# Patient Record
Sex: FEMALE | Race: BLACK OR AFRICAN AMERICAN | NOT HISPANIC OR LATINO | URBAN - METROPOLITAN AREA
[De-identification: names, ages, dates, MRNs, and addresses within clinical notes are randomized per-mention and may not be internally consistent; named-entity substitution may affect disease eponyms.]

---

## 2024-03-15 ENCOUNTER — OFFICE VISIT (OUTPATIENT)
Dept: PRIMARY CARE | Facility: EXTERNAL LOCATION | Age: 24
End: 2024-03-15

## 2024-03-15 VITALS
HEART RATE: 96 BPM | WEIGHT: 210.4 LBS | TEMPERATURE: 98.3 F | OXYGEN SATURATION: 100 % | RESPIRATION RATE: 16 BRPM | DIASTOLIC BLOOD PRESSURE: 72 MMHG | BODY MASS INDEX: 39.73 KG/M2 | HEIGHT: 61 IN | SYSTOLIC BLOOD PRESSURE: 108 MMHG

## 2024-03-15 DIAGNOSIS — Z01.84 IMMUNITY STATUS TESTING: ICD-10-CM

## 2024-03-15 DIAGNOSIS — Z02.0 SCHOOL HEALTH EXAMINATION: Primary | ICD-10-CM

## 2024-03-15 PROBLEM — E66.01 OBESITY, CLASS III, BMI 40-49.9 (MORBID OBESITY) (MULTI): Status: ACTIVE | Noted: 2023-01-06

## 2024-03-15 PROBLEM — J34.3 HYPERTROPHY OF NASAL TURBINATES: Status: ACTIVE | Noted: 2022-09-16

## 2024-03-15 PROBLEM — J35.2 ADENOID HYPERTROPHY: Status: RESOLVED | Noted: 2022-09-16 | Resolved: 2024-03-15

## 2024-03-15 PROBLEM — J35.2 ADENOID HYPERTROPHY: Status: ACTIVE | Noted: 2022-09-16

## 2024-03-15 PROBLEM — K92.1 HEMATOCHEZIA: Status: ACTIVE | Noted: 2023-06-14

## 2024-03-15 PROBLEM — A74.9 CHLAMYDIA INFECTION: Status: ACTIVE | Noted: 2024-03-15

## 2024-03-15 PROBLEM — R61 HYPERHIDROSIS: Status: ACTIVE | Noted: 2024-03-15

## 2024-03-15 PROBLEM — A59.9 TRICHOMONIASIS: Status: ACTIVE | Noted: 2023-06-14

## 2024-03-15 PROBLEM — H52.209 ASTIGMATISM: Status: ACTIVE | Noted: 2024-03-15

## 2024-03-15 PROBLEM — D57.3 SICKLE CELL TRAIT (CMS-HCC): Status: ACTIVE | Noted: 2023-06-14

## 2024-03-15 PROBLEM — H52.10 MYOPIA: Status: ACTIVE | Noted: 2024-03-15

## 2024-03-15 PROBLEM — K27.9 PEPTIC ULCER: Status: ACTIVE | Noted: 2023-06-14

## 2024-03-15 PROCEDURE — 86787 VARICELLA-ZOSTER ANTIBODY: CPT | Mod: WESLAB | Performed by: NURSE PRACTITIONER

## 2024-03-15 PROCEDURE — 86706 HEP B SURFACE ANTIBODY: CPT | Mod: WESLAB | Performed by: NURSE PRACTITIONER

## 2024-03-15 PROCEDURE — 86317 IMMUNOASSAY INFECTIOUS AGENT: CPT | Mod: WESLAB | Performed by: NURSE PRACTITIONER

## 2024-03-15 PROCEDURE — 86765 RUBEOLA ANTIBODY: CPT | Mod: WESLAB | Performed by: NURSE PRACTITIONER

## 2024-03-15 PROCEDURE — 86735 MUMPS ANTIBODY: CPT | Mod: WESLAB | Performed by: NURSE PRACTITIONER

## 2024-03-15 RX ORDER — MEDROXYPROGESTERONE ACETATE 150 MG/ML
150 INJECTION, SUSPENSION INTRAMUSCULAR
COMMUNITY
Start: 2022-11-11 | End: 2024-09-04

## 2024-03-15 ASSESSMENT — ENCOUNTER SYMPTOMS
TROUBLE SWALLOWING: 0
DIFFICULTY URINATING: 0
SEIZURES: 0
NECK PAIN: 0
STRIDOR: 0
SPEECH DIFFICULTY: 0
ACTIVITY CHANGE: 0
WEAKNESS: 0
NECK STIFFNESS: 0
COLOR CHANGE: 0
TREMORS: 0
FATIGUE: 0
JOINT SWELLING: 0
ABDOMINAL PAIN: 0
BACK PAIN: 0
FEVER: 0
SHORTNESS OF BREATH: 0
LIGHT-HEADEDNESS: 0
RHINORRHEA: 0
DIARRHEA: 0
WHEEZING: 0
COUGH: 0
EYE DISCHARGE: 0
CONSTIPATION: 0
DIZZINESS: 0
POLYDIPSIA: 0
ARTHRALGIAS: 0
DECREASED CONCENTRATION: 0
CONFUSION: 0
DIAPHORESIS: 0
MYALGIAS: 0
APPETITE CHANGE: 0
EYE PAIN: 0
NAUSEA: 0
POLYPHAGIA: 0
UNEXPECTED WEIGHT CHANGE: 0
HEADACHES: 0
ABDOMINAL DISTENTION: 0
PHOTOPHOBIA: 0
CHILLS: 0
NUMBNESS: 0
VOMITING: 0
EYE REDNESS: 0
PALPITATIONS: 0
EYE ITCHING: 0
VOICE CHANGE: 0
CHEST TIGHTNESS: 0

## 2024-03-15 NOTE — PROGRESS NOTES
"Subjective   Patient ID: Juan Zheng is a 23 y.o. 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    HPI:  Here for nursing school physical.     Has past medical history of seizures: last one at age 10. Unknown cause. Was told can \"happen again\".     Denies chest pain, shortness of breath, musculoskeletal complaints, neuro complaints, vision changes or any symptoms that would interfere with ability to participate in nursing school, nursing clinicals or provide patient care. Has no symptoms/complaints at this time.      Patient has never been restricted/declined participation in activities from a medical standpoint.      Denies family history of sudden cardiac death at an early age.     No Known Allergies      Current Outpatient Medications on File Prior to Visit   Medication Sig    medroxyPROGESTERone 150 mg/mL injection syringe Inject 1 mL (150 mg) into the muscle.     No current facility-administered medications on file prior to visit.          Past Medical History:   Diagnosis Date    Astigmatism 03/15/2024    Hypertrophy of nasal turbinates 09/16/2022    Myopia 03/15/2024    Peptic ulcer 06/14/2023    Seizure (CMS/[facility])     last one at age 10    Sickle cell trait (CMS/[facility]) 06/14/2023       Past Surgical History:   Procedure Laterality Date    ADENOIDECTOMY      TONSILLECTOMY  09/24/2014    Tonsillectomy With Adenoidectomy       No family history on file.     Review of Systems   Constitutional:  Negative for activity change, appetite change, chills, diaphoresis, fatigue, fever and unexpected weight change.   HENT:  Negative for congestion, dental problem, hearing loss, rhinorrhea, tinnitus, trouble swallowing and voice change.    Eyes:  Negative for photophobia, pain, discharge, redness, itching and visual disturbance.   Respiratory:  Negative for cough, chest tightness, shortness of breath, wheezing and stridor.    Cardiovascular:  Negative for chest pain, palpitations and leg swelling. " "  Gastrointestinal:  Negative for abdominal distention, abdominal pain, constipation, diarrhea, nausea and vomiting.   Endocrine: Negative for cold intolerance, heat intolerance, polydipsia, polyphagia and polyuria.   Genitourinary:  Negative for difficulty urinating.   Musculoskeletal:  Negative for arthralgias, back pain, gait problem, joint swelling, myalgias, neck pain and neck stiffness.   Skin:  Negative for color change and rash.   Neurological:  Negative for dizziness, tremors, seizures, syncope, speech difficulty, weakness, light-headedness, numbness and headaches.   Psychiatric/Behavioral:  Negative for behavioral problems, confusion and decreased concentration.        Objective     Visit Vitals  /72   Pulse 96   Temp 36.8 °C (98.3 °F)   Resp 16   Ht 1.549 m (5' 1\")   Wt 95.4 kg (210 lb 6.4 oz)   SpO2 100%   BMI 39.75 kg/m²   OB Status Injection   Smoking Status Never   BSA 2.03 m²       Vision Screening    Right eye Left eye Both eyes   Without correction 20/30 20/30 20/30   With correction           Physical Exam  Constitutional:       General: She is not in acute distress.     Appearance: Normal appearance. She is not ill-appearing.   HENT:      Head: Normocephalic and atraumatic.      Right Ear: Tympanic membrane, ear canal and external ear normal.      Left Ear: Tympanic membrane, ear canal and external ear normal.      Nose: Nose normal.      Mouth/Throat:      Mouth: Mucous membranes are moist.      Pharynx: Oropharynx is clear. No oropharyngeal exudate or posterior oropharyngeal erythema.   Eyes:      Extraocular Movements: Extraocular movements intact.      Conjunctiva/sclera: Conjunctivae normal.      Pupils: Pupils are equal, round, and reactive to light.   Cardiovascular:      Rate and Rhythm: Normal rate and regular rhythm.      Pulses: Normal pulses.      Heart sounds: Normal heart sounds. No murmur heard.  Pulmonary:      Effort: Pulmonary effort is normal. No respiratory distress.     "  Breath sounds: Normal breath sounds. No wheezing, rhonchi or rales.   Abdominal:      General: Abdomen is flat. Bowel sounds are normal. There is no distension.      Palpations: Abdomen is soft.      Tenderness: There is no abdominal tenderness. There is no guarding.   Musculoskeletal:         General: No swelling, tenderness, deformity or signs of injury. Normal range of motion.      Right upper arm: Normal.      Left upper arm: Normal.      Right wrist: Normal.      Left wrist: Normal.      Right hand: Normal.      Left hand: Normal.      Cervical back: Normal, normal range of motion and neck supple. No deformity, rigidity or bony tenderness. Normal range of motion.      Thoracic back: Normal. No deformity or bony tenderness. Normal range of motion.      Lumbar back: Normal. No deformity or bony tenderness. Normal range of motion.      Right hip: Normal.      Left hip: Normal.      Right upper leg: Normal.      Left upper leg: Normal.      Right knee: Normal.      Left knee: Normal.      Right lower leg: Normal. No deformity. No edema.      Left lower leg: Normal. No deformity. No edema.      Comments: Strength normal and equal in upper and lower extremities ANAMARIA   Skin:     General: Skin is warm.      Capillary Refill: Capillary refill takes less than 2 seconds.   Neurological:      General: No focal deficit present.      Mental Status: She is alert. Mental status is at baseline.      Cranial Nerves: No cranial nerve deficit.      Sensory: No sensory deficit.      Motor: No weakness.      Coordination: Coordination normal.      Gait: Gait normal.      Deep Tendon Reflexes: Reflexes normal.   Psychiatric:         Mood and Affect: Mood normal.         Behavior: Behavior normal.         Thought Content: Thought content normal.           Assessment/Plan   Diagnoses and all orders for this visit:  School health examination  -     Hepatitis B Surface Antibody  -     Mumps Antibody, IgG  -     Rubella Antibody, IgG  -      Rubeola Antibody, IgG  -     Varicella Zoster Antibody, IgG  Immunity status testing  -     Hepatitis B Surface Antibody  -     Mumps Antibody, IgG  -     Rubella Antibody, IgG  -     Rubeola Antibody, IgG  -     Varicella Zoster Antibody, IgG      - Will need TDAP, COVID and influenza records- per school forms.   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   - Titers drawn.   - Did not need TB.    - Follow up with PCP for routine medical exams, preventative screenings and with development of any symptoms.

## 2024-03-16 LAB
HBV SURFACE AB SER-ACNC: 16.1 MIU/ML
MEV IGG SER QL IA: POSITIVE
MUMPS IGG ANTIBODY INDEX: 2.7 IA
MUV IGG SER IA-ACNC: POSITIVE
RUBEOLA IGG ANTIBODY INDEX: 1.6 IA
RUBV IGG SERPL IA-ACNC: 1.9 IA
RUBV IGG SERPL QL IA: POSITIVE
VARICELLA ZOSTER IGG INDEX: 1.6 IA
VZV IGG SER QL IA: POSITIVE